# Patient Record
Sex: FEMALE | Race: WHITE
[De-identification: names, ages, dates, MRNs, and addresses within clinical notes are randomized per-mention and may not be internally consistent; named-entity substitution may affect disease eponyms.]

---

## 2019-03-04 ENCOUNTER — HOSPITAL ENCOUNTER (EMERGENCY)
Dept: HOSPITAL 46 - ED | Age: 54
Discharge: HOME | End: 2019-03-04
Payer: COMMERCIAL

## 2019-03-04 VITALS — HEIGHT: 67 IN | BODY MASS INDEX: 25.11 KG/M2 | WEIGHT: 160 LBS
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
## 2019-03-04 DIAGNOSIS — Z79.899: ICD-10-CM

## 2019-03-04 DIAGNOSIS — I10: ICD-10-CM

## 2019-03-04 DIAGNOSIS — F10.129: Primary | ICD-10-CM

## 2019-03-04 DIAGNOSIS — Z87.891: ICD-10-CM

## 2019-03-04 PROCEDURE — G0480 DRUG TEST DEF 1-7 CLASSES: HCPCS

## 2019-03-04 NOTE — EKG
[facility]
                                    [address]
                                  [place]  [address]
_________________________________________________________________________________________
                                                                 Signed   
 
 
Normal sinus rhythm
Possible Left atrial enlargement
Borderline ECG
When compared with ECG of 13-NOV-2017 15:45,
No significant change was found
Confirmed by CARLIN JANSEN MD ([id number]) on 3/4/2019 7:40:10 PM
 
 
 
 
 
 
 
 
 
 
 
 
 
 
 
 
 
 
 
 
 
 
 
 
 
 
 
 
 
 
 
 
 
 
 
 
 
 
 
    Electronically Signed By: CARLIN JANSEN MD  03/04/19 1940
_________________________________________________________________________________________
PATIENT NAME:     MARTIR RIVASHANNAH LITTLE                     
MEDICAL RECORD #: [id number]                     Electrocardiogram             
          ACCT #: [id number]  
DATE OF BIRTH:   [birth year]                                       
PHYSICIAN:   CARLIN JANSEN MD                   REPORT #: [id number]
REPORT IS CONFIDENTIAL AND NOT TO BE RELEASED WITHOUT AUTHORIZATION

## 2020-04-25 NOTE — EKG
[facility]
                                    [address] [place] [address]
                                  [place]  [address]
_________________________________________________________________________________________
                                                                 Signed   
 
 
Normal sinus rhythm
Possible Left atrial enlargement
Borderline ECG
When compared with ECG of 04-MAR-2019 15:26,
No significant change was found
Confirmed by YEE CASPER MD ([id number]) on 4/25/2020 10:14:18 PM
 
 
 
 
 
 
 
 
 
 
 
 
 
 
 
 
 
 
 
 
 
 
 
 
 
 
 
 
 
 
 
 
 
 
 
 
 
 
 
    Electronically Signed By: YEE CASPER MD  04/25/20 2214
_________________________________________________________________________________________
PATIENT NAME:     ANAHI RIVAS                     
MEDICAL RECORD #: [id number]                     Electrocardiogram             
          ACCT #: [id number]  
DATE OF BIRTH:   [birth year]                                       
PHYSICIAN:   YEE CASPER MD           REPORT #: [id number]
REPORT IS CONFIDENTIAL AND NOT TO BE RELEASED WITHOUT AUTHORIZATION

## 2022-01-21 NOTE — NUR
AFTER CHECKING IN PT, REQUESTED ANESTHESIA CONSULT WITH PATIENT REGARDING
ANXIETY, VITAL SIGNS, AND TREMOR. CRNA MEDICATED PT WITH PRECEDEX IN BAG OF
IVF. PULSE OXIMETER REMAINS ON PT. ONE ON ONE CARE AT THIS POINT.

## 2022-01-21 NOTE — NUR
01/21/22 0930 Germania Kraus
0922-PATIENT ARRIVED TO PACU ON 4L NC RR EVEN LAYING LEFT LATERAL.
PATIENT NONAROUSABLE, SR. ABDOMEN SOFT. IVF INFUSING.
 
0925-PATIENT AROUSING OPENING EYES ORIENTED TO PACU HOLDING PILLOW TO
CHEST. DENIES PAIN OR NAUSEA. PATIENT LOOKING AROUND.

## 2022-01-21 NOTE — NUR
PT SITTING UP IN BED, LEGS CROSSED AND READING. PT ADMITS SHE IS ANXIOUS, AND
DIDN'T REALLY KNOW WHY. GAVE COMFORT, PT REQUESTED PRAYER BEGAN SHIVERING. PT
ACCEPTED WARM BLANKET, IAIN NEWMAN IN TO CHECK ON PT.  CRNA DEMI ALSO IN CARING
FOR PT.  WILL  PT FOLLOWING DC. WILL FOLLOW AS NEEDED

## 2022-01-22 NOTE — OR
[facility]
                                    [address] [place] [address], [place]  [address]
_________________________________________________________________________________________
                                                                 Signed   
 
 
DATE OF OPERATION:
01/21/2022
 
SURGEON:
Rubén Buck MD
 
PREOPERATIVE DIAGNOSES:
1. Chronic abdominal pain.
2. Daily alcohol use.
3. Elevated liver function test.
4. Nausea and vomiting.
5. Odynophagia.
6. Gastroesophageal reflux disease.
7. History of non-steroidal anti-inflammatory drugs, now discontinued.
8. Intermittent rectal bleeding.
9. Intermittent diarrhea.
10. Intermittent melena.
 
POSTOPERATIVE DIAGNOSES:
1. Moderate diffuse gastritis.
2. Small hiatal hernia.
3. Mild distal esophagitis.
4. 12-15 mm polyp at 8 cm (left lateral, tattoo).
5. 6 mm polyps x2 at base of cecum.
6. 4 mm polyp on the ileocecal valve.
7. 4 mm polyps x3 in proximal right colon.
8. Minimal sigmoid diverticulosis.
 
PROCEDURES:
1. Esophagogastroduodenoscopy with CLOtest and biopsies of the duodenum, pyloric bulb
and antrum. 
2. Colonoscopy, snare polypectomy, hot biopsy and injection of tattoo.
3. Rigid proctoscopy.
 
INDICATIONS:
Brooke is a 56-year-old female, who was asked to see me for upper and lower endoscopy.
I had met her in 2010 at the age 44.  She had similar symptoms with chronic abdominal
pain and daily alcohol use.  Her liver function tests were elevated along with her mean
cell volume.  Of course, she has been encouraged not to use Tylenol.  She has had nausea
and vomiting, diaphoresis and even odynophagia.  She complains of acid reflux.  She has
been trying Protonix and sucralfate.  She has been given Phenergan as well.  She has
intermittent diarrhea and rectal bleeding associated with intermittent melena.  She
 
    Electronically Signed By: RUBÉN BUCK MD  01/22/22 0634
_________________________________________________________________________________________
PATIENT NAME:     BROOKE RIVAS                     
MEDICAL RECORD #: [id number]            OPERATIVE REPORT              
          ACCT #: [id number]  
DATE OF BIRTH:   [birth year]            REPORT #: [id number]      
PHYSICIAN:        RUBÉN BUCK MD             
PCP:              JACK CORONA PA-C           
REPORT IS CONFIDENTIAL AND NOT TO BE RELEASED WITHOUT AUTHORIZATION
 
 
                                  [facility]
                                    [address] [place], [place]  [address]
_________________________________________________________________________________________
                                                                 Signed   
 
 
discontinued her diclofenac.  She has had her gallbladder removed previously.  She could
not remember the upper and lower endoscopy from 2010.  She has no family history of
colon cancer or polyps.  Her  has also been through several colonoscopies with
myself.  Consequently, they are familiar with this process.  In the office, I gave her a
pamphlet on upper and lower endoscopy.  She understands the nature of the two tests.  We
reviewed the risks including, but not limited to gas bloating, crampy abdominal pain,
bleeding, perforation requiring surgery, and missed diagnosis.  Also, due to her medical
issues and her daily use of alcohol, we asked that an anesthesia provider help us once
again with monitored anesthesia care and infusion of propofol.  That proved to be a henderson
decision.  In fact, she required Precedex in our preop area due to withdrawal symptoms.
She had expressed understanding and wished to proceed. 
 
PROCEDURE IN DETAIL:
Brooke was taken into our endoscopy suite and placed in a supine semi-recumbent
position.  The posterior oropharynx was anesthetized with lidocaine spray.  A bite block
was utilized for the case.  The adult gastroscope was introduced and advanced under
direct visualization of camera.  Monitored anesthesia care was provided with propofol
and ketamine.  The duodenum was unremarkable.  We took a biopsy from it for pathologic
review.  We also took a biopsy of the pyloric channel for pathologic review.  The
stomach showed mild to moderate diffuse erythematous changes consistent with a daily
alcohol use.  We took a biopsy of the antrum for CLOtest as well as pathologic review.
We did not see any obvious ulcerations.  Upon retroflexion of scope, we could see just a
small hiatal hernia.  We did not see any gastric or esophageal varices.  The scope had
been withdrawn up through the area of the GE junction, which was compliant without
stricture.  She does have mild irritation and disruption around her Z-line.  There was
no Villa's mucosa.  Very minimal irritation at this point in her distal esophagus.
Again, we did not see any obvious varices.  The middle and upper esophagus were
unremarkable.  After this, the gas was suctioned out, the gastroscope removed.  Brooke
tolerated the procedure quite well. 
 
Brooke was then rotated into the left lateral decubitus position.  She was maintained on
monitored anesthesia care per our nurse anesthetist.  A digital rectal exam was
performed and I could feel a polypoid lesion somewhere between 4 and 7 cm just on
digital rectal exam.  She does have moderate sized and indurated external hemorrhoids.
She has good sphincter tone.  The adult colonoscope was introduced.  We could easily see
this 12 to 15 mm polyp in her rectum.  We removed it with a combination of the snare and
hot biopsy forceps.  We injected a small tattoo right in the base of the center of the
polypectomy site.  The scope had been reintroduced and we passed the scope all the way
up into the cecum without difficulty.  Her prep was good.  We could see the appendiceal
orifice and the ileocecal valve.  The above-mentioned polyps were removed with the help
of the hot biopsy forceps and then we used the snare on one of the polyps in the base of
the cecum.  The scope was then withdrawn and we saw few diverticula in the sigmoid
 
    Electronically Signed By: RUBÉN BUCK MD  01/22/22 0634
_________________________________________________________________________________________
PATIENT NAME:     BROOKE RIVAS                     
MEDICAL RECORD #: [id number]            OPERATIVE REPORT              
          ACCT #: [id number]  
DATE OF BIRTH:   [birth year]            REPORT #: [id number]      
PHYSICIAN:        RUBÉN BUCK MD             
PCP:              JACK CORONA PA-C           
REPORT IS CONFIDENTIAL AND NOT TO BE RELEASED WITHOUT AUTHORIZATION
 
 
                                  [facility]
                                    [address] [place] [address]  [address]
_________________________________________________________________________________________
                                                                 Signed   
 
 
colon.  There were small to moderate in size, few in number, and scattered about.  Once
in the rectum we retroflexed the scope and we did not see any additional pathology above
the anal canal.  We could see our polypectomy site.  We then inserted our rigid
proctoscope and we found the polypectomy site to be 8 cm in the left lateral decubitus
position.  After this, the gas was allowed to escape and the rigid proctoscope was
removed.  Brooke tolerated the procedure quite well. 
 
RECOMMENDATIONS:
I will see Boroke back in my office in 7 to 14 days to review her results.  She needs to 
strongly consider discontinuation of her alcohol.  She will need monitored anesthesia
care in the future as described above. 
 
 
 
            ________________________________________
            Rubén Buck MD 
 
 
ALB/MODL
Job #:  [id number]
DD:  01/21/2022 09:38:54
DT:  01/21/2022 10:08:28
 
cc:            ALISA Schulte MD
 
 
Copies:  JACK CORONA PA-C, ANDREW L MD
~
 
 
 
 
 
 
 
 
 
 
 
 
    Electronically Signed By: RUBÉN BUCK MD  01/22/22 0634
_________________________________________________________________________________________
PATIENT NAME:     MARTIR RIVASHANNAH LITTLE                     
MEDICAL RECORD #: [id number]            OPERATIVE REPORT              
          ACCT #: [id number]  
DATE OF BIRTH:   [birth year]            REPORT #: [id number]      
PHYSICIAN:        RUBÉN BUCK MD             
PCP:              JACK CORONA PA-C           
REPORT IS CONFIDENTIAL AND NOT TO BE RELEASED WITHOUT AUTHORIZATION

## 2022-01-24 NOTE — PATH
[facility]
                                    [address], [place]  [address]
_________________________________________________________________________________________
                                                                 Signed   
 
 
 
SPECIMEN(S): A DUODENAL BIOPSY
SPECIMEN(S): B DUODENAL BULB BIOPSY
SPECIMEN(S): C ANTRUM/PYLORUS BIOPSY
SPECIMEN(S): D RECTAL POLYP AT 8 CM
SPECIMEN(S): E CECAL POLYP
SPECIMEN(S): F ILEOCECAL VALVE POLYP
SPECIMEN(S): G ASCENDING/RIGHT COLON POLYP
 
SPECIMEN SOURCE:
A. DUODENAL BIOPSY
B. DUODENAL BULB BIOPSY
C. ANTRUM/PYLORUS BIOPSY
D. RECTAL POLYP AT 8 CM
E. CECAL POLYP
F. ILEOCECAL VALVE POLYP
G. ASCENDING/RIGHT COLON POLYP
 
CLINICAL HISTORY:
Esophagogastroduodenoscopy/colonoscopy.  Acid reflux, diarrhea, rectal 
bleeding.  Postop Dx: EGD: Gastritis, small hiatal hernia, distal esophagitis.  
Colon: Colorectal polyps, diverticulosis. 
 
FINAL PATHOLOGIC DIAGNOSIS:
A.  Duodenum, biopsy:
-  Duodenal mucosa with focal prominent Brunner's glands.
-  Negative for increased intraepithelial lymphocytes or villous blunting.
-  Negative for dysplasia or malignancy.
B.  Duodenal bulb, biopsy:
-  Duodenal mucosa with Brunner's glands hyperplasia.
-  Negative for increased intraepithelial lymphocytes or villous blunting.
-  Negative for dysplasia or malignancy.
C.  Stomach, antrum/pylorus, biopsy:
-  Reactive gastropathy.
-  Negative for Helicobacter organisms on HE stain.
-  Negative for dysplasia or malignancy.
D.  Rectum, polyp at 8 cm, polypectomy:
-  Fragments of tubular adenoma.
-  Negative for high-grade dysplasia or malignancy.
E.  Colon, cecum, polyp, polypectomy:
-  Fragments of tubular adenoma.
-  Negative for high-grade dysplasia or malignancy.
 
                                                                                    
_________________________________________________________________________________________
PATIENT NAME:     ANAHI RIVAS                     
MEDICAL RECORD #: [id number]            PATHOLOGY                     
          ACCT #: [id number]       ACCESSION #: [id number]     
DATE OF BIRTH:   [birth year]            REPORT #: [id number]       
PHYSICIAN:        RASHEED ALCOCER              
PCP:              JACK CORONA PA-C           
REPORT IS CONFIDENTIAL AND NOT TO BE RELEASED WITHOUT AUTHORIZATION
 
 
                                  [facility]
                                    [address], [place]  [address]
_________________________________________________________________________________________
                                                                 Signed   
 
 
F.  Colon, ileocecal valve, polyp, polypectomy:
-  Tubular adenoma.
-  Negative for high-grade dysplasia or malignancy.
G.  Colon, ascending/right, polyp, polypectomy:
 
-  Fragments of tubular adenoma.
-  Negative for high-grade dysplasia or malignancy.
NAL:cml:C2NR
 
MICROSCOPIC EXAMINATION:
Histologic sections of all submitted blocks are examined by light microscopy.  
These findings, together with the gross examination, support the pathologic 
diagnosis. 
 
GROSS DESCRIPTION:
Seven specimens are received in seven containers, labeled "RL."
A.  The specimen, labeled "RL, duodenum biopsy," is received in formalin and 
consists of two tan soft tissue fragments that measure 0.2 cm in greatest 
dimension.  The specimen is entirely submitted in 
cassette (A1).
B.  The specimen, labeled "RL, duodenum bulb biopsy," is received in formalin 
and consists of one tan soft tissue fragment that measures 0.2 cm in greatest 
dimension.  The specimen is entirely 
submitted in cassette (B1).
C.  The specimen, labeled "RL, antrum biopsy," is received in formalin and 
consists of one tan soft tissue fragment that measures 0.1 cm in greatest 
dimension.  The specimen is entirely submitted in 
cassette (C1).
D.  The specimen, labeled "RL, rectal polyp at 8 cm," is received in formalin 
and consists of multiple tan soft tissue fragments that measure 2.4 x 2.2 x 0.2 
cm in aggregate.  The specimen is entirely 
submitted in cassette (D1).
 
E.  The specimen, labeled "RL, cecum polyp," is received in formalin and 
consists of seven tan soft tissue fragments that measure 0.1-0.2 cm in greatest 
dimension.  The specimen is entirely submitted 
in cassette (E1).
F.  The specimen, labeled "RL: Ileocecal valve polyp," is received in formalin 
and consists of one tan soft tissue fragment that measures 0.1 cm in greatest 
dimension.  The specimen is entirely 
submitted in cassette (F1).
G.  The specimen, labeled "RL, ascending colon polyp," is received in formalin 
 
                                                                                    
_________________________________________________________________________________________
PATIENT NAME:     ANAHI RIVAS                     
MEDICAL RECORD #: [id number]            PATHOLOGY                     
          ACCT #: [id number]       ACCESSION #: [id number]     
DATE OF BIRTH:   [birth year]            REPORT #: [id number]       
PHYSICIAN:        RASHEED ALCOCER              
PCP:              JACK CORONA PA-C           
REPORT IS CONFIDENTIAL AND NOT TO BE RELEASED WITHOUT AUTHORIZATION
 
 
                                  [address]  [address]
_________________________________________________________________________________________
                                                                 Signed   
 
 
and consists of four tan soft tissue fragments that measure 0.1-0.2 cm in 
greatest dimension.  The specimen is entirely 
submitted in cassette (G1).
JS (under the direct supervision of a pathologist)
The Gross Description was prepared using a voice recognition system. The report 
was reviewed for accuracy; however, sound-alike word errors, addition and/or 
deletions may occur. If there is any 
question about this report, please contact Client Services.
 
PERFORMING LABORATORY:
The technical component was performed by [facility], [address] [place] [id number] (Medical Director: Naomi Acosta MD; CLIA# [id number]).  
Professional interpretation was performed by 
[facility], [facility], [address] [address] (CLIA# [id number]). 
 
Diagnostician:  Ashley Gray MD
Pathologist
Electronically Signed 01/24/2022
 
 
Copies:                                
~
 
 
 
 
 
 
 
 
 
 
 
 
 
 
 
 
 
 
 
 
                                                                                    
_________________________________________________________________________________________
PATIENT NAME:     ANAHI RIVAS                     
MEDICAL RECORD #: [id number]            PATHOLOGY                     
          ACCT #: [id number]       ACCESSION #: [id number]     
DATE OF BIRTH:   [birth year]            REPORT #: [id number]       
PHYSICIAN:        RASHEED ALCOCER              
PCP:              JACK CORONA PA-C           
REPORT IS CONFIDENTIAL AND NOT TO BE RELEASED WITHOUT AUTHORIZATION

## 2024-08-26 NOTE — NUR
PT ARRIVED TO THE UNIT VIA STRETCHER WITH RN. REPORT RECEIVED FROM ER RN. PT
SLEEPING ON ARRIVAL BUT EASILY WAKENS WITH VERBAL STIMULI. PT VITAL SIGNS ARE
STABLE.

## 2024-08-26 NOTE — NUR
RETURN TO CCU, REPORT RECEIVED FROM CRNA AND SURGERY RN. PATIENT RECEIVED
PROPOFOL AND KETAMINE FOR SEDATION DURNING ENDOSCOPY. PATIENT IS VERY DROWSY.
ABLE TO FOLLOW FEW COMMANDS. SOFI VILLAS RESTARTED.

## 2024-08-26 NOTE — NUR
REPORT RECEIVED. PATIENT IS AWARE . DR. BUCK HAS BEEN IN ROOM TO SEE PATIENT.
EGD PLANNED FOR TODAY. PATIENT IS DROWSY.

## 2024-08-26 NOTE — CONS
[facility]
                                    [address], [place]  [address]
_________________________________________________________________________________________
                                                                 Signed   
 
 
DATE OF CONSULTATION:
08/26/2024
 
CHIEF COMPLAINT:
Melena.
 
HISTORY OF PRESENT ILLNESS:
Brooke is a 58-year-old female, apparently I helped the endoscopy clear back in 2010 at
age of 44.  I also helped her with upper and lower endoscopy again in January 2022 at
the age of 56.  There is a long history of daily alcohol use with chronic abdominal
pain, elevated liver function test, nausea, vomiting, odynophagia, acid reflux, hiatal
hernia, intermittent rectal bleeding with intermittent diarrhea and intermittent melena.
 We found that indeed she had moderate diffuse gastritis and distal esophagitis along
with a small hiatal hernia.  She also had multiple polyps in the rectum and throughout
the colon.  She has minimal sigmoid diverticulosis.  Once again she had trouble now for
two days of nausea, vomiting, and dehydration.  She finally came to the emergency room
for evaluation.  She was severely dehydrated with a lactic acid of 12.9 and elevated
liver function tests with a low albumin and elevated INR and a slightly elevated ammonia
level.  She was admitted to the Internal Medicine Service.  She has been hydrated
overnight and overall doing better, but she is sedated this morning.  Of course, she
demonstrated melena and is guaiac positive.  I was therefore asked to see her as a
general surgeon on-call for consideration of upper endoscopy. 
 
PAST MEDICAL HISTORY:
Hiatal hernia, acid reflux, colonic polyps, diverticulosis, and cirrhosis of the liver.
 
PAST SURGICAL HISTORY:
Includes upper and lower endoscopy in 2010 at age 44 with Dr. Curtis and again upper and
lower endoscopy in January 2022 with Dr. Curtis at the age of 56.  She has also had a
laparoscopic cholecystectomy in 2017 with Dr. Colindres. 
 
SOCIAL HISTORY:
She drinks at least six beers every day.  She is  to Eduardo Coughlin at
[phone].  Kelly Terrazas is her Physician Assistant.  She prefers [facility]. 
 
FAMILY HISTORY:
Not obtainable.
 
REVIEW OF SYSTEMS:
She is sedated, not obtainable.  The records were reviewed.
 
ALLERGIES:
None.
 
 
    Electronically Signed By: RUBÉN BUCK MD  08/26/24 1137
_________________________________________________________________________________________
PATIENT NAME:     BROOKE RIVAS                     
MEDICAL RECORD #: [id number]            CONSULTATION                  
          ACCT #: [id number]  
DATE OF BIRTH:   [birth year]            REPORT #: [id number]      
PHYSICIAN:        RUBÉN BUCK MD             
PCP:              KELLY TERRAZAS PA-C           
REPORT IS CONFIDENTIAL AND NOT TO BE RELEASED WITHOUT AUTHORIZATION
 
 
                                  [facility]
                                    [address], [place]  [address]
_________________________________________________________________________________________
                                                                 Signed   
 
 
MEDICATIONS:
Spironolactone 25 mg p.o. daily, metoprolol ER 50 mg p.o. daily, Keppra 500 mg p.o.
b.i.d., bupropion 150 mg p.o. daily and vitamin D. 
 
PHYSICAL EXAMINATION:
VITAL SIGNS:  Blood pressure is 90/61, heart rate 113, respiratory rate 27, she is 98.5
degrees.  She is 98% on 2 L nasal cannula.  She is 5 feet 7 inches at 66 kg.  Body mass
index of 23. 
GENERAL:  Brooke is a 58-year-old female lying supine in her ICU bed.  She is sedated,
but she was arousable to alert and awake, but really not interactive and she was not
answering any questions.  She quickly fell back asleep. 
LUNGS:  Clear to auscultation bilaterally. 
HEART:  She is in sinus tachycardia with no murmurs. 
ABDOMEN:  Soft, flat, nontender.  No fluid wave.
 
LABORATORY DATA:
Her white blood count 9.9, hemoglobin was 12.2 it is down to 8.9 with resuscitation.
Her mean cell volume is 113.  Potassium 3.2, CO2 30, creatinine 0.65.  Platelet count
was 52,000, it was 77,000.  Lactic acid now 2.2, it was 12.9.  Phosphorus 2.1, magnesium
1.6.  Alcohol less than 3.  Total bilirubin 3.3, AST 82, ALT 35, alkaline phosphatase
93.  Beta-hCG negative.  Albumin is 1.9.  Urine specific gravity was greater than 1.030.
 Her INR is 2.42.  Ammonia level is 34. 
 
RADIOGRAPHIC STUDIES:
A chest x-ray from yesterday was unremarkable.  An ultrasound in 2017 with Dr. Colindres
showed possible sludge, but no mention of any cirrhosis.  An ultrasound in 2021 with
Kelly Terrazas showed what looks like some early cirrhosis and a common bile duct around 6
mm and then the ultrasound in December 2023 with Kelly Terrazas shows the common bile duct
now around 10 mm with the cirrhosis, but no ascites with normal hepatopetal flow. 
 
ASSESSMENT AND PLAN:
Brooke is a 58-year-old female, who presents with progressing cirrhosis related to her
alcohol consumption.  She once again has the same presentation with the abdominal pain,
nausea, vomiting, dehydration with associated daily alcohol use and probably withdrawal.
 I have been asked to see her for consideration of upper endoscopy.  We will have to
arrange this around the middle of a day with the help of an anesthesia provider.  In the
meantime, she needs her potassium and phosphorus magnesium replaced.  They do have a
banana bag with her IV.  We will need to get consent later today from her  with 
respect to her sedation.  I reviewed this with the nurse.  She has expressed
understanding and agrees with the above plan. 
 
 
 
            ________________________________________
 
    Electronically Signed By: RUBÉN BUCK MD  08/26/24 1137
_________________________________________________________________________________________
PATIENT NAME:     BROOKE RIVAS                     
MEDICAL RECORD #: [id number]            CONSULTATION                  
          ACCT #: [id number]  
DATE OF BIRTH:   [birth year]            REPORT #: [id number]      
PHYSICIAN:        RUBÉN BUCK MD             
PCP:              KELLY TERRAZAS PA-C           
REPORT IS CONFIDENTIAL AND NOT TO BE RELEASED WITHOUT AUTHORIZATION
 
 
                                  [address]
                                  [place], [place]  [address]
_________________________________________________________________________________________
                                                                 Signed   
 
 
            Rubén Buck MD 
 
 
ALB/MODL
Job #:  [id number]
DD:  08/26/2024 07:07:24
DT:  08/26/2024 07:41:10
 
cc:            MD Kelly Burgess PA-C
 
 
Copies:  RUBÉN BUCK MD, CHLOE K PA-C
~
 
 
 
 
 
 
 
 
 
 
 
 
 
 
 
 
 
 
 
 
 
 
 
 
 
 
 
 
 
    Electronically Signed By: RUBÉN BUCK MD  08/26/24 1137
_________________________________________________________________________________________
PATIENT NAME:     BROOKE RIVAS                     
MEDICAL RECORD #: [id number]            CONSULTATION                  
          ACCT #: [id number]  
DATE OF BIRTH:   [birth year]            REPORT #: [id number]      
PHYSICIAN:        RUBÉN BUCK MD             
PCP:              KELLY TERRAZAS PA-C           
REPORT IS CONFIDENTIAL AND NOT TO BE RELEASED WITHOUT AUTHORIZATION

## 2024-08-26 NOTE — NUR
UP TO COMMODE TO EXPELL URINE WITH LIQUID GREEN STOOL. UNSTEADY ON FEET.
FOLLOWS DIRECTION WELL. MORE ALERT NOW. SITTING UP IN BED TO ATTEMPT TO TAKE
CLEAR LIQUIDS WITH STRAWBERRY ENSURE.  IS IN ROOM. NO S/S OF DT'S.

## 2024-08-26 NOTE — NUR
Scheduled medications administered and assessment complete. Patient resting in
bed with eyes closed, awakens to this RN entering. Denies pain or needs. Takes
carafate in slurry. VSS, on RA, Afebrile. LSC, HRR, BTA. CMS, skin intact.
Denies need for BSC. Call light in reach.

## 2024-08-26 NOTE — NUR
Report received from Buffy TIMMONS. Patient resting in bed, states no needs at this
time, denies pain. IVF infusing WNL. Patient agreeable to plan of care for
night. Call light in reach.

## 2024-08-26 NOTE — NUR
PT IS RESTING IN BED WITH EYES CLOSED. RESPIRATIONS EVEN AND UNLABORED. PT
VITAL SIGNS ARE STABLE. CALL LIGHT WITHIN REACH. BED IN LOW AND LOCKED
POSTION WITH BED ALARM ON.

## 2024-08-26 NOTE — NUR
PATIENT PUT CALL LIGHT ON TO REQUEST TO GET OOB TO COMMODE. HAD VERY SOFT DARK
BROWN GREENISH STOOL. BACK TO BED WITH INCIDENT.

## 2024-08-26 NOTE — NUR
PT UP TO COMMODE. PT STATES SHE FEELS WEAK. PT AMBULATED TO BEDSIDE COMMODE
WITH X2 SBA. PT HAD SMALL BM, BLACK IN COLOR. PT ALSO INCONTIENT OF URINE.
NEW LINEN PROVIDED. PT WAS A X2 PERSON HEAVY ASSIST BACK TO BED. PT PROVIDED
WITH NEW GOWN. PT DENIES ANY PAIN OR FEELING NAUSEOUS AT THIS TIME.
PT IS ALERT, BUT FALLS ASLEEP WHILE TALKING. PT IS EASILY WOKEN UP BY VERBAL
STIMULATION. PT STATES SHE HAS NO FURTHER NEEDS AT THIS TIME. CALL LIGHT
WITHIN REACH, BED IN LOW AND LOCKED POSTION, BED ALARM ON.

## 2024-08-26 NOTE — NUR
SPOKE TO PATIENT ABOUT THE DISCHARGE PLAN. PATIENT'S  IS AT BEDSIDE.
PATIENT PLANS TO GO HOME WITH HER .PATIENT CAN AFFORD HOUSING AND FOOD.
PATIENT DOES NOT USE DME. PATIENT CAN TAKE CARE OF ADLS. PATIENT S/P GOING TO
THE OR TO BE SCOPED. PATIENT DENIES THE NEED FOR CASE MANAGEMENT'S
HELP AT THIS TIME. ENCOURANGED FAMILY TO LET  KNOW IF THEY NEED
FUTURE HELP.

## 2024-08-26 NOTE — NUR
UP TO BR TO VOID AND HAVE SMALL GRAINY BLACK STOOL. LEGS WEAK, NEEDS DIRECTION
AND ASSIST WITH TRANSFER TO COMMODE. CWIA DONE. RATES 2. NO TREATMENT NEEDED.
CLORAHEXADINE BATH GIVEN.

## 2024-08-26 NOTE — NUR
ASSESSMENT DONE. PATIENT REMAINS DROWSY. SLOW TO RESPOND TO QUESTIONS.
ABLE TO FOLLOW COMMANDS, SURGICAL PERMIT SIGNED. PATIENT C/O SLIGHT HA. HAS
SLIGHT DISCOMFORT IN LOWER ABD.

## 2024-08-26 NOTE — NUR
UR CLINICAL REVIEW:
[facility] INPT CRITERIA FOR
GI BLEED,WILL NOTIFY MD REYES
INPT 08/26/24 @ 8816
WILL UPDATE REG
WILL FAX CLINICALS TO
[facility] FOR AUTH
DISCHARGE TO HOME WHEN
STABLE

## 2024-08-26 NOTE — NUR
PT UP TO COMMODE AND BACK TO BED. PT AMBULATED WELL WITH X1 SBA. PT VOIDED
500CC. PT PROVIDED WITH A WARM BLANKET. NO FURTHER NEEDS AT THIS TIME. CALL
LIGHT WITHIN REACH. BED IN LOW AND LOCKED POSTION WITH BED ALARM ON.

## 2024-08-26 NOTE — NUR
THIS SRT IN ROOM WITH IAIN MORRIS TO ASSIST WITH MORNING MED PASS. PT SNORING IN
BED, VERY SLEEPY. PT ROUSABLE WITH SOME EFFORT. MORNING VS DONE, MEDS GIVEN.
DR. JUNIOR IN ROOM FOR MORNING ROUNDING. CALL LIGHT WITHIN REACH, PT DENIES
ANY FURTHER NEEDS AT THIS TIME.

## 2024-08-27 NOTE — NUR
SBAR REPORT RECEIVED FROM IAIN MORRIS.  ALL EVENTS OF THE DAY WERE DISCUSSED AND
PLAN OF CARE REVIEWED.  PATIENT ANAHI IS NOTED TO BE PLEASANT AND
COOPERATIVE, CONVERSING WITH RN STAFF, ABLE TO ENDORSE NEEDS AND DESIRES.
 
1959- RN LAB DRAW FOR CBC/BMP
2005- 1 P ASSIST TO BATHROOM.  300CC YELLOW URINE/STOOL MIX.  SURJIT CARE
PERFORMED WITH ASSISTANCE
2006- BACK TO BED, SCD'S PLACED BACK ON, WARM BLANKET PROVIDED.  ANAHI
REQUESTED "SOMETHING FOR SLEEP" TONIGHT.
2030- MD FRIED CALLED FOR MELATONIN.  SEE EMAR FOR NEW ORDER.
 
VSS AND WDL PER MONITOR
 
NEUR0- ALERT AND ORIENTED X 4, FOLLOWS COMMANDS, ENDORSES DULL PAIN IN
ABDOMEN. WARM PACKS PROVIDED, PERRL, STANDBY ASSIST
 
CARDIAC- ST, AFEBRILE, NO EDEMA, PALPABLE PULSES, MURMUR AUSCULTATED
 
RESP- RA, CLEAR BREATH SOUNDS CLEAR, 02 97%
 
GI/- HYPERACTIVE BOWEL TONES, VOIDS/ BM IN COMMODE, BILIOUS STOOL NOTED,
SWALLOWS PILLS DISSOLVED IN LIQUID
 
INT- SEE ASSESSMENT
 
BILATERAL PIV- PATENT AND INTACT.  ABLE TO FLUSH BUT NOT ASPIRATE
 
NS AT 75CC PER HOUR

## 2024-08-27 NOTE — NUR
Rounded on patient, resting in bed with eyes closed, RR even and unlabored. IV
pump totals cleared. No identified needs at this time.

## 2024-08-27 NOTE — NUR
Patient resting in bed with eyes closed. Even and unlabored RR noted. IVF
infusing WNL. On RA. VSS. Call light in reach.

## 2024-08-27 NOTE — NUR
SITTING UP IN CHAIR. ABD IS MORE DISTENDED. HAS BEEN USING HEAT FOR COMFORT ON
LOWER ABD. PATIENT HAS BEEN ALERT MOST OF THE DAY. COOPERATIVE. EYES JAUNDICE.
TALKED WITH PATIENT ABOUT GETTING HELP FOR ETOH ADDICTION. PATIENT RECEPTIVE
TO THIS TALK.

## 2024-08-27 NOTE — NUR
REPORT RECEIVED. PATIENT UP TO COMMODE TO EXPELL URINE WITH GREEN STOOL THEN
TO CHAIR. FOLLOWING DIRECTION. SHAKEY ON FEET.

## 2024-08-27 NOTE — NUR
Rounded on patient for focused assessment, patient resting in bed with eyes
closed, on RA, even and unlabored respirations noted. IVF infusing. VSS. No
needs identified at this time. Call light in reach.

## 2024-08-27 NOTE — NUR
Rounded on patient, who is sitting up in bed with gown off, appearing confused
to surroundings. Startled to this RN entering. Patient states "I wet the bed",
this RN and RN Alecia in room for linen change and assisting patient to BSC for
further void. Smear of green/brown BM noted. Patient denies needs at this
time. New bag of IV fluid hung per order. Call light in reach.

## 2024-08-27 NOTE — NUR
PT REQUESTED TO USE BSC. THIS SRT AND IAIN MORRIS IN ROOM TO ASSIST. PT SEEMS TO
BE MOVING BETTER AND STRONGER TODAY. PLACED BSC AT 90 DEGREE ANGLE TO BED. PT
VOIDED 300 CC OF DARK ORANGE COLORED URINE. IAIN MORRIS ASSISTED PT BACK TO BED
WHILE THIS SRT MEASURED AND FLUSHED URINE. TRASHES EMPTIED, ROOM TIDIED.
SCD'S HOOKED BACK UP, CALL LIGHT WITHIN REACH. PT DENIES ANY FURTHER NEEDS AT
THIS TIME.

## 2024-08-27 NOTE — NUR
HOURLY ROUNDING COMPLETED.  ANAHI REPORTED RESTOOM  NEEDS.  STANDBY ASSIST TO
RESTROOM.  SINUS TACHYCARDIA WITH ACTIVITY .
300CC CLEAR
YELLOW URINE.
 
BACK IN BED.  NO OTHER NEEDS REPORTED AT THIS TIME

## 2024-08-27 NOTE — NUR
VISITED DURING SPIRITUAL CARE ROUNDS.  PT APPEARED TO BE SLEEPING.  DID NOT
DISTURB.  PROVIDED PRAYER.

## 2024-08-27 NOTE — OR
[facility]
                                    [address], [place]  [address]
_________________________________________________________________________________________
                                                                 Signed   
 
 
DATE OF OPERATION:
08/26/2024
 
SURGEON:
Rubén Buck MD
 
PREOPERATIVE DIAGNOSES:
1. Melena.
2. Alcohol withdrawal.
3. Nausea and vomiting.
4. Cirrhosis of the liver.
5. Hiatal hernia.
6. Acid reflux.
 
POSTOPERATIVE DIAGNOSES:
1. Distal antral gastric ulcer (nonbleeding).
2. Moderate diffuse gastritis.
3. Mild-to-moderate distal esophagitis.
4. Tiny hiatal hernia.
 
PROCEDURE:
Esophagogastroduodenoscopy with CLOtest.
 
ESTIMATED BLOOD LOSS:
None.
 
INDICATIONS:
Brooke is a 58-year-old female, well known to our hospital.  She has alcohol-related
cirrhosis of the liver.  I helped her clear back in 2010 at the age of 44 with upper and
lower endoscopy.  I helped her again in January of 2022 at the age of 56.  She is known
to have some gastritis, esophagitis, and a small hiatal hernia.  She also had colonic
polyps and diverticulosis.  In 2017, Dr. Colindres had removed her gallbladder.  In looking
back, the ultrasound in 2017 mentioned some sludge in the gallbladder, but no obvious
cirrhosis.  An ultrasound in 2021 describes cirrhosis and the common bile duct is 6 mm.
Another ultrasound in December of 2023 shows again that the gallbladder had been
removed.  The common bile duct up to about 10 mm along with the cirrhosis of the liver,
but no ascites.  Brooke had developed nausea, vomiting, probably some level of alcohol
withdrawal in the last couple of days.  She finally came to the emergency room for
evaluation.  She was having black tarry stool.  Of course, it is guaiac positive.  She
was quite dehydrated with a lactic acid of 12.2.  Liver function tests were off
including INR 2.42.  She was admitted to the hospital service.  She has been
aggressively hydrated, lactic acid come down to 2.2.  Her electrolytes have been
 
    Electronically Signed By: RUBÉN BUCK MD  08/27/24 0602
_________________________________________________________________________________________
PATIENT NAME:     BROOKE RIVAS                     
MEDICAL RECORD #: [id number]            OPERATIVE REPORT              
          ACCT #: [id number]  
DATE OF BIRTH:   [birth year]            REPORT #: [id number]      
PHYSICIAN:        RUBÉN BUCK MD             
PCP:              JACK CORONA PA-C           
REPORT IS CONFIDENTIAL AND NOT TO BE RELEASED WITHOUT AUTHORIZATION
 
 
                                  [facility]
                                    [address], [place]  [address]
_________________________________________________________________________________________
                                                                 Signed   
 
 
replaced.  Her hemoglobin went from 12.2 down to 8.9 with resuscitation.  The platelets
were 77,000, down to 52,000.  The mean cell volume is 113.  Total bilirubin is 3.3, AST
82, ALT 35, alkaline phosphatase is 93, and albumin is 1.9.  Ammonia was a little up at
34.  I have been asked to see her as a general surgeon on-call for consideration of
repeat upper endoscopy.  Otherwise, Brooke has done well overnight.  She has also needed
a little Ativan throughout the night.  I met with Brooke this morning, and I reviewed
the above findings with her.  We also reviewed upper endoscopy.  She is very aware of
upper and lower endoscopy.  She understands there is risk including, but not limited to
gas bloating, crampy abdominal pain, bleeding, perforation requiring surgery, and missed
diagnosis.  We also reviewed the need for monitored anesthesia care given her medical
issues including liver failure as well as the acuity of the situation.  She had
expressed understanding and wished to proceed. 
 
PROCEDURE NOTE:
Brooke was taken into our endoscopy suite and placed in the supine semi-recumbent
position.  She was given monitored anesthesia care with propofol infusion per our nurse
anesthetist.  A bite block was utilized for the case.  The adult gastroscope was
introduced and advanced under direct visualization of camera without difficulty.  The
duodenum and pyloric bulb were unremarkable; however, she has a moderate-sized
nonbleeding ulcer in the distal antrum with surrounding edema and erythema.  We took a
single biopsy in the antrum for CLOtest.  The rest of her stomach showed moderate
diffuse gastritis, which was chronic in her situation.  She has a tiny hiatal hernia,
which we saw upon retroflexion of scope.  The scope was withdrawn up to the area of the
GE junction, which was compliant without stricture.  We saw no gastric or esophageal
varices.  From the vomiting, she does have some irritation around the Z-line and up the
distal esophagus.  The middle and upper esophagus were much better.  After this, the gas
was suctioned out and the gastroscope removed.  Brooke tolerated the procedure quite
well. 
 
RECOMMENDATIONS:
Brooke will be returning to the ICU under the hospital service.  She will be returning
to a clear liquid diet and stay on her proton pump inhibitor. 
 
 
 
            ________________________________________
            Rubén Buck MD 
 
 
ALB/MODL
Job #:  [id number]
DD:  08/26/2024 12:06:52
 
    Electronically Signed By: RUBÉN BUCK MD  08/27/24 0602
_________________________________________________________________________________________
PATIENT NAME:     BROOKE RIVAS                     
MEDICAL RECORD #: [id number]            OPERATIVE REPORT              
          ACCT #: [id number]  
DATE OF BIRTH:   [birth year]            REPORT #: [id number]      
PHYSICIAN:        RUBÉN BUCK MD             
PCP:              JACK CORONA PA-C           
REPORT IS CONFIDENTIAL AND NOT TO BE RELEASED WITHOUT AUTHORIZATION
 
 
                                  [facility]
                                    [address] [place]  [address]
_________________________________________________________________________________________
                                                                 Signed   
 
 
DT:  08/26/2024 12:53:13
 
cc:            ALISA Schulte MD
 
 
Copies:  JACK CORNOA PA-C, ANDREW L MD
~
 
 
 
 
 
 
 
 
 
 
 
 
 
 
 
 
 
 
 
 
 
 
 
 
 
 
 
 
 
 
 
 
 
    Electronically Signed By: RUBÉN BUCK MD  08/27/24 0602
_________________________________________________________________________________________
PATIENT NAME:     BROOKE RIVAS                     
MEDICAL RECORD #: [id number]            OPERATIVE REPORT              
          ACCT #: [id number]  
DATE OF BIRTH:   [birth year]            REPORT #: [id number]      
PHYSICIAN:        RUBÉN BUCK MD             
PCP:              JACK CORONA PA-C           
REPORT IS CONFIDENTIAL AND NOT TO BE RELEASED WITHOUT AUTHORIZATION

## 2024-08-27 NOTE — NUR
IN ROOM WITH IAIN MORRIS FOR MORNING CARES AND MORNING MED PASS. PT IS SITTING UP
IN CHAIR WITH BLANKET, REQUESTS THAT HER FEET STAY DOWN FOR CURRENT MOMENT. PT
REFUSED BREAKFAST, BUT WAS AGREEABLE TO AN APPLE FLAVORED ENSURE ON BEDSIDE
TABLE IN CASE SHE FELT LIKE HAVING SOME LATER. PT HAS BEEN DRY HEAVING. IAIN MORRIS ADMINISTERED ANTI-NASUEA MEDS. MORNING MED PASS DONE.  RAJ IN
ROOM. ROOM TIDIED, TRASH EMPTIED. CALL LIGHT WITHIN REACH, PT AND  DENY
AND FURTHER NEEDS AT THIS TIME.

## 2024-08-27 NOTE — NUR
Sitting up in recliner. Discussed alcohol use. Offered to assist patient to
get connected to [facility]. States "I'm sure I can find the number in the phone
book or on the internet." Does not want further information at this time. INDIA Cheatham RN, notified of converstation with patient.

## 2024-08-27 NOTE — NUR
ASSESSMENT DONE. NAUSEATED. NOT ABLE TO TAKE BREAKFAST AT THIS TIME. C/O LOWER
ABD PAIN. ABD IS SOFT AND DISTENDED. IVF PATENT. SOMEWHAT SLOW TO ANWERE
QUESTIONS. FLAT AFFECT. TALKED WITH PATIENT ABOUT POC. HAS NO QUESTIONS AT
THIS TIME.

## 2024-08-27 NOTE — NUR
TOOK ONLY FEW BITES OF DINNER. HAS BEEN TAKING MILK AND APPLE ENSURE. IV KCL
INFUSING. DR. FRIED AWARE THE LABS WILL BE DRAWN LATE. ABD IS LARGER TODAY
THAN YESTERDAY. ABD IS SOFT.

## 2024-08-27 NOTE — NUR
PM MEDICATIONS TAKEN WHOLE WITH STRAWBERRY ENSURE.  ANAHI ENDORSED RESTROOM
NEEDS. STAND BY ASSIST TO BATHROOM.  300CC YELLOW URINE VOIDED.  BACK IN BED,
STATES COMFORTABLE

## 2024-08-28 NOTE — NUR
REPORT RECEIVED FROM SHEYLA @ 9265.  PT UP IN RECLINER, NO NEEDS AT THIS
TIME.  WHITE BOARD UPDATED.

## 2024-08-28 NOTE — NUR
HAND OFF REPORT RECEIVED FROM IAIN KIM. PT SITTING IN CHAIR. PT STATES SHE IS
TIRED AND DID NOT SLEEP VERY WELL. PT IS A/O. PT IS ON ROOM AIR WITH
SATURATION %. PT BT ARE ACTIVE AND ABDOMEN IS TENDER TO TOUCH. PT STATES
HER PAIN LEVEL IS A 3/10. PT DENIES FEELING NAUSEOUS. PT VITAL SIGNS STABLE.
NO NEEDS AT THIS TIME. CALL LIGHT WITHIN REACH.

## 2024-08-28 NOTE — NUR
PT TO ROOM ON MED/SURG AFTER WORKING WITH PHYSICAL THERAPY. RECEIVED REPORT
FROM IAIN PENA. PT UP TO CHAIR AWAKE, DENIES PAIN, NAUSEA, AND SOB AT THIS
TIME. PT ARRIVES TO ROOM WITH ONE IV IN L AC. SCDs NOT IN PLACE AT THIS TIME
D/T PT WORKING WITH PHYSICAL THERAPY AND THEN BEING UP TO CHAIR IN ROOM.
ABDOMEN REMAINS MILDLY DISTENDED, TENDER TO PALPATION. BOWEL TONES ACTIVE.
GENERALIZED SWELLING PRESENT IN L HAND, CCU RN STATES BEGAN WHEN IV IN L HAND
WAS IN, L HAND IV DC'D PRIOR TO PT TRANSFER PER CCU RN. PT DENIES PAIN IN
AREA, NO REDNESS IN AREA. PT STATES NO NEEDS AT THIS TIME, CALL LIGHT WITHIN
REACH.

## 2024-08-28 NOTE — NUR
OCCUPATIONAL THERAPY IN ROOM TO SEE PATIENT. PT UP TO BATHROOM, X1 SBA. PT
TOLERATED WELL. PT VOIDED 300CC OF YELLOW URINE. PT BRUSHED TEETH AND PROVIDED
WITH A WASH CLOTH TO WASH HER FACE. PT BACK TO CHAIR. NO FURTHER NEEDS AT THIS
TIME. CALL LIGHT WITHIN REACH.

## 2024-08-28 NOTE — NUR
PT SITTING UP IN CHAIR EATING LUNCH. DR FRIED ROUNDED IN PT ROOM TO DISCUSS
UPDATED PLAN OF CARE. PLAN IS TO MOVE PT TO THE MED-SURG FLOOR AND DISCHARGE
IN THE MORNING. PT APPEARS TO BE IN A MUCH BETTER MOOD THIS AFTERNOON. PT IS
ALERT AND ORIENTED. PT REMAINS ON ROOM AIR, LUNG SOUNDS ARE CLEAR. PT
BT ARE ACTIVE, ABDOMEN REMAINS TENDER TO TOUCH. PT DENIES ANY PAIN OR FEELING
NAUSEOUS AT THIS TIME. PT HAS NO NEEDS AT THIS TIME. CALL LIGHT WITHIN REACH.

## 2024-08-28 NOTE — NUR
ASSESSMENT COMPLETED.  PT IN BED,  WAS VISITING, BUT HAS GONE HOME.
R/A, ANSWERS QUESTIONS APPROPRIATELY, KNOWN TO THIS NURSE.  PT DID NOT STATE
SHE REMEMBERS THIS RN.  PT DENIES PAIN, OR NEEDS.  HS MEDICATIONS
ADMINISTERED, PT HAS STATED SHE DID NOT SLEEP MUCH LAST NIGHT IN CCU.  HOPES
TO SLEEP.  POC ACCEPTABLE TO PT.  PT INSTRUCTED TO CALL, ALL SUPPLIES WITHIN
REACH.

## 2024-08-28 NOTE — NUR
PT UP TO RESTROOM. PT WAS STEADY ON FEET,  X1 SBA. PT VOIDED 200CC OF URINE.
PT STATES SHE WANTED TO GET BACK IN BED AND TRY TO SLEEP. PT BECAME TEARFUL,
AND STATES SHE WANTED TO BE LEFT ALONE. PT REQUESTED DOOR TO BE CLOSED. CALL
LIGHT WITHIN REACH. BED IN LOW AND LOCKED POSITON.

## 2024-08-28 NOTE — NUR
RIGHT PERIPHERAL IV SITE TAKEN OUT. TIP INTACT. PT TOLERATED WELL. COBAND AND
GAUZE APPLIED TO SITE.

## 2024-08-28 NOTE — NUR
PATIENT ANAHI IS RESTING WITH EYES CLOSED.  LYING ON RIGHT SIDE.  VSS WDL PER
MONITOR.  SAFETY CHECK OF ROOM PERFORMED. NO NEEDS IDENTIFIED AT THIS TIME

## 2024-08-28 NOTE — NUR
pt resting in bed with eyes closed. Respirations even and unlabored. Pt vital
signs stable. call light within reach.

## 2024-08-28 NOTE — NUR
REFERRED BY IIAN KIM FOR VISIT.  PT EXHIBITING SIGNS OF DEPRESSION:  FLAT
AFFECT, LACK OF ENGAGEMENT, LIMITED RESPONSES.   PROVIDED SUPPORTIVE
PRESENCE, FACILITATED INTERACTION WITH THERAPY DOG, FACILITATED LIFE REVIEW,
PROVIDED PRAYER.  PT EXPRESSED HOPE, GRATITUDE.

## 2024-08-28 NOTE — NUR
PT UP TO CHAIR EATING DINNER, DENIES PAIN, NAUSEA, OR SOB AT THIS TIME. PT
STATES NO CURRENT NEEDS. CALL LIGHT WITHIN REACH.

## 2024-08-28 NOTE — NUR
PATIENT ASSISTED TO THE BR AS A SBA. PATIENT INCONTINENT OF A SMALL AMOUNT OF
URINE. PATIENT ABLE TO VOID AND HAVE BM. PATIENT ABLE TO COMPLETE SELF CARE.
PATIENT DID HAVE NOTED BRIGHT RED BLOOD ON WIPE WHEN COMPLETING SELF SURJIT
CARE. NO NOTED BLOOD IN TOILET WITH BM OR URINE. PATIENTS LINEN AND GOWN
CHANGED. PATIENT IS BACK IN BED RESTING. PATIENT PROVIDED WITH GINGER ALE.
PATIENT DENIES ANY FURTHER NEEDS. CALL LIGHT IN REACH. IV INFUSING PER ORDER. Walk in [employer]

## 2024-08-28 NOTE — NUR
ANAHI CALLED AND ENDORSED RESTROOM NEEDS.  300CC YELLOW URINE.
 
BACK IN BED, TUCKED IN, SCD IN PLACE, GTT NS 75CC PER HOUR

## 2024-08-28 NOTE — NUR
PT LEFT HAND PERIPHERAL IV TAKEN OUT. PT STATES IV SITE IS PAINFUL. IV SITE
WITH TRACE AMOUNT OF SWELLING NOTED. TIP INTACT WITH REMOVAL. PATIENT
TOLERATED WELL.

## 2024-08-28 NOTE — NUR
PT LEFT UNIT AND TRANSFERRED TO MEDICAL FLOOR ROOM 113. ALL PATIENT BELONGINGS
TAKEN WITH PATIENT. PATIENT VITAL SIGNS STABLE. REPORT GIVEN TO IAIN WILLAMS.

## 2024-08-28 NOTE — NUR
UR CONCURRENT REVIW:
MCG- DOES NOT MEET GL STAGE
2 FOR HEMATIOLOGY GRG.
IOANA COMPLETED
[facility] BCBS
INPT 02/26/24 @ 2935
CLINICAL SUBMITTED FOR AUTH
REVIEW
DISCHARGE TO HOME WHEN
STABLE
08/30/24

## 2024-08-28 NOTE — NUR
PRISCILLA ASSIST TO THE RESTROOM.  UP IN CHAIR. PATIENT ENDORSES HUNGER THIS AM.
ANAHI WOULD LIKE A STRAWBERRY/
BANANA SMOOTHIE, BUTTERED WHEAT TOAST, APPLE ENSURE, AND WHOLE MILK FOR
BREAKFAST.

## 2024-08-28 NOTE — NUR
PT FINISHED WITH LUNCH AND WANTING TO GET BACK IN BED. PT AMBULATED TO BED
WITH X1 SBA AND TOLERATED WELL. PT REQUEST HER WATCH OUT OF LOCK BOX,
WATCH PROVIDED. PT ATE ABOUT 40% OF HER LUNCH, STATES SHE WANTED TO TAKE IT
SLOW. PT HAS NO FURTHER NEES AT THIS TIME. CALL LIGHT WITHIN REACH.

## 2024-08-29 NOTE — NUR
PT UP TO CHAIR AWAKE, DENIES PAIN, NAUSEA, OR SOB. TELE REMAINS IN PLACE. L
HAND EDEMA DECREASED TO TRACE AMOUNT. PT NOT WEARING SCDs D/T WALKING
AROUND. PT CONTINUES TO HAVE MINIMAL DISTENTION AND TENDERNESS TO ABDOMEN,
BOWEL TONES ACIVE. PT EXPRESSES ANXIETY SURROUNDING BILLS FOR HOSPITAL STAY
AND PRESCRIPTIONS. CASE MANAGEMENT CONTACTED AND UPDATED. PT STATES NO
FURTHER NEEDS AT THIS TIME. CALL LIGHT WITHIN REACH.

## 2024-08-29 NOTE — NUR
THIS RN ASSUMING CARE OF PATIENT. PATIENT RESTING IN BED, DENIES NEEDS AT THIS
TIME. CALL LIGHT IN REACH.

## 2024-08-29 NOTE — NUR
PATIENT IN CHAIR AT THIS TIME. VITALS AND I&O'S CHARTED. CALL LIGHT WITHIN
REACH, NO FURTHER NEEDS AT THIS TIME.

## 2024-08-29 NOTE — NUR
CALL LIGHT ANSWERED. PATIENT UP TO BATHROOM INDEPENDENTLY TO VOID. PATIENT
BACK TO BED. THIS ASKED PATIENT IF SHE HAS ANY PAIN, OR PAIN FROM REGULAR DIET
YESTERDAY. PATIENT DENIES PAIN. NO FURTHER NEEDS. CALL LIGHT IN REACH.

## 2024-08-29 NOTE — NUR
Requested by pts Rn to return to room and speak with pt. Pt is extremely
anxious. In and spoke with pt and she is near tears several times during the
conversation. Pt is concerned her insurance will not cover her stay.  She
believes her insurance may have been down graded. I let her know we have not
recieved a denial from her insurance. When she receives a bill she can fill
out a financial assistance form if she has difficulty pay her bill. Pt is very
anxious and close to tears. I asked pt what had caused her to be so upset and
she could not say. I asked if she has depression and she does, she also sees a
counselor weekly. I asked if I could schedule an appt for her to see and she
does not want to give out their name. She states she will schedule when she
gets home. She states Dr. Walsh agreed to order her an antidepressant. Pt
stating I just sit around the house, Im not working. Her  works long
shifts and will not be home until around 8.  I asked if I can call a friend to
stay with her until her spouse gets home and she declined. I asked pt if she
ever thinks about suicide. She states, "I'm to scared."  I asked again if she
is suicidal and she shrugged her shoulders and states, "I wouldn't do it". Pt
is upset as she planned on leaving by 9am this morning and plans were changed
due to emergency pt on the floor.  was not able to see pt first and then pt
need K and MG IV. Pt stating she is ok and will go home by taxi when IV's are
complete. I confirmed with Dr. Walsh he is ordering her an antidepressant.
Pt will discharge when IV's complete.

## 2024-08-29 NOTE — NUR
CALL LIGHT ANSWERED. PT NEEDED TO USE BATHROOM. CNA SBA PT TO BATHROOM. PT
VOIDED. PT ASSISTED BACK TO BED. CNA OBTAINED AND DOCUMENTED I&O. PT STATES NO
FURTHER NEEDS AT THIS TIME. CALL LIGHT WITHIN REACH.

## 2024-08-29 NOTE — NUR
PATIENT ASKING HOW TO PLACE ORDER TO KITCHEN FOR BREAKFAST. THIS RN PROVIDED
MENU AND EDUCATED PATIENT HOW TO ORDER FOOD. PATIENT VERBILIZED UNDERSTANDING.
VS AND I&Os OBTAINED AND RECORDED. SCHEDULED MEDICATION ADMNISTERED. PATIENT
DENIES NEEDS AT THIS TIME. FRESH WATER PROVIDED. CALL LIGHT IN REACH.

## 2024-09-08 NOTE — XMS
PreManage Notification: ANAHI RIVAS MRN:[id number]
 
Security Information
 
Security Events
No recent Security Events currently on file
 
 
 
CRITERIA MET
------------
- [facility] - 2 Visits in 30 Days
 
 
CARE PROVIDERS
There are no care providers on record at this time.
 
[facility] has no Care Guidelines for this patient.
 
[facility] VISIT COUNT (12 MO.)
-------------------------------------------------------------------------------------
[facility]
-------------------------------------------------------------------------------------
TOTAL 2
-------------------------------------------------------------------------------------
NOTE: Visits indicate total known visits.
 
ED/C VISIT TRACKING (12 MO.)
-------------------------------------------------------------------------------------
09/08/2024 09:56
[facility] OR
 
TYPE: Emergency
 
COMPLAINT:
- ALTERED STATUS
-------------------------------------------------------------------------------------
08/25/2024 17:37
[facility]
 
TYPE: Emergency
 
COMPLAINT:
- VOMITING
-------------------------------------------------------------------------------------
 
 
INPATIENT VISIT TRACKING (12 MO.)
-------------------------------------------------------------------------------------
08/26/2024 08:25
[facility]
 
TYPE: Medical Surgical
 
COMPLAINT:
- DEHYDRATION,HYPOKALEMIA,ETOH WITHDRAWEL
 
DIAGNOSES:
- Acidosis, unspecified
 
- Acidosis, unspecified
- Acquired absence of other specified parts of digestive tract
- Acquired absence of other specified parts of digestive tract
- Alcohol use, unspecified with withdrawal, unspecified
- Alcohol use, unspecified with withdrawal, unspecified
- Alcoholic cirrhosis of liver without ascites
- Alcoholic cirrhosis of liver without ascites
- Anemia, unspecified
- Anemia, unspecified
- Chronic or unspecified gastric ulcer with hemorrhage
- Chronic or unspecified gastric ulcer with hemorrhage
- Dehydration
- Dehydration
- Depression, unspecified
- Depression, unspecified
- Diaphragmatic hernia without obstruction or gangrene
- Diaphragmatic hernia without obstruction or gangrene
- Essential (primary) hypertension
- Essential (primary) hypertension
- Gastritis, unspecified, with bleeding
- Gastritis, unspecified, with bleeding
- Gastro-esophageal reflux disease with esophagitis, with bleeding
- Gastro-esophageal reflux disease with esophagitis, with bleeding
- Gastrointestinal hemorrhage, unspecified
- Hypokalemia
- Hypokalemia
- Hypomagnesemia
- Hypomagnesemia
- Other disorders of phosphorus metabolism
- Other disorders of phosphorus metabolism
- Other specified postprocedural states
- Other specified postprocedural states
- Personal history of colonic polyps
- Personal history of colonic polyps
- Personal history of nicotine dependence
- Personal history of nicotine dependence
- Thrombocytopenia, unspecified
- Thrombocytopenia, unspecified
- Tobacco abuse counseling
- Tobacco abuse counseling
-------------------------------------------------------------------------------------
 
[email or web address]

## 2024-09-08 NOTE — NUR
EVENING ASSESSMENT COMPLETE. EVENING MEDICATIONS ADMIN PER EMAR. PT ALERT AND
ORIENTED X 3. VS AND I&O OBTAINED. PT DENIES NEEDING TO VOID AT THIS TIME.
PT RESTLESS IN BED SETTING OFF THE BED ALARM MULTIPLE TIMES. TREMORS NOTED IN
BUE. PT STATES CHRONIC. OCCASIONAL BACK SPASMS WITH MOVEMENT NOTED. ASSISTED
PT TO REPOSITION. PT DENIES QUESTIONS OR CONCERNS. CALL LIGHT IN REACH. BED
ALARM FOR SAFETY.

## 2024-09-08 NOTE — NUR
REPORT RECEIVED FROM DAY SHIFT RN. PT LYING IN BED ALERT. ASSISTED TO
REPOSITION. DENIES NEEDS. WHITE BOARD UPDATED. BED ALARM FOR SAFETY. CALL
LIGHT IN REACH.

## 2024-09-08 NOTE — EKG
[facility]
                                    [address]
                                  [place]  [address]
_________________________________________________________________________________________
                                                                 Signed   
 
 
Normal sinus rhythm
Nonspecific T wave abnormality
Prolonged QT
Abnormal ECG
When compared with ECG of 19-JAN-2022 17:49,
Nonspecific T wave abnormality, worse in Anterolateral leads
Confirmed by Jaun Chavarria MD ([id number]) on 9/8/2024 6:46:59 PM
 
 
 
 
 
 
 
 
 
 
 
 
 
 
 
 
 
 
 
 
 
 
 
 
 
 
 
 
 
 
 
 
 
 
 
 
 
 
    Electronically Signed By: JAUN CHAVARRIA MD  09/08/24 1847
_________________________________________________________________________________________
PATIENT NAME:     EVELYNANAHI                     
MEDICAL RECORD #: [id number]                     Electrocardiogram             
          ACCT #: [id number]  
DATE OF BIRTH:   [birth year]                                       
PHYSICIAN:   JAUN CHAVARRIA MD                       REPORT #: [id number]
REPORT IS CONFIDENTIAL AND NOT TO BE RELEASED WITHOUT AUTHORIZATION

## 2024-09-08 NOTE — NUR
BED ALARMING. PATIENT STATED WANT TO USE THE BATHROOM. RN CHARGE AND THIS CNA
HELPED PATIENT USE THE BEDSIDE COMMODE. PATIENT IS UNSTABLE ON HER FEET.
PATIENT HOLD ON TO THE STAFF'S ARMS TO BE ABLE TO STAND UP AND MAKE STEPS.
PATIENT IS WOBBLY.
PATIENT IS BACK IN BED. BED ALARM ON FOR SAFETY.

## 2024-09-08 NOTE — NUR
PT TO FLOOR AT 1551, THIS RN TO BEDSIDE AT 1605. PT STATES PAIN IS 0/10 AT THE
MOMENT WHILE LYING IN BED. PT DISORIENTED TO DATE AND TIME, STATES IT IS
SATURDAY THE 27TH OF SEPTEMBER, 2005. PT REORIENTED, PT STATES "I WILL
PROBABLY FORGET". PT STATES TOES ARE NUMB AND TINGLING, BUT INTACT SENSATION
IN ALL OTHER AREAS. PT STATES SHE HAS NOT DRANK ALCOHOL SINCE SHE WAS LAST
ADMITTED APPROXIMATELY 2 WEEKS AGO, HAS BEEN DOING [facility] OUTPATIENT TREATMENT.
PT FORGETFUL AND CONFUSED AT TIMES DURING ADMISSION PROCESS. PT ABLE TO ANSWER
ADMISSION QUESTIONS APPROPRIATELY. PT STATES NO CURRENT NEEDS, CALL LIGHT
WITHIN REACH. BED ALARM ON FOR SAFETY. PT TAKES PO MEDICATION W/O DIFFICULTY.

## 2024-09-09 NOTE — NUR
PT BED ALARM GOING OFF. PT MUMBLING. PT REORIENTED. NOW BACK IN BED, BED ALARM
ON. BOOK PROVIDED FOR HER TO READ. DENIES ANY NEEDS, CALL LIGHT IN REACH

## 2024-09-09 NOTE — NUR
PT OOB TO VOID AN UNMEASURED CONCENTRATED AMOUNT. ABLE TO DO OWN SURJIT CARE AND
WASH HANDS AT SINK. BACK TO BED. GAIT UNSTEADY. PT HAS DIFFICULTY FOLLOWING
DIRECTIONS AND MOVEMENT IS UNCOORDINATED. DENIES FURTHER NEEDS. BED ALARM FOR
SAFETY. CALL LIGHT IN REACH.

## 2024-09-09 NOTE — NUR
PT ASSESSMENT COMPLETE. PT GROANING AND MUMBLING. PT C/O 10/10 PAIN. HEAT PACK
PROVIDED. PT RESTING IN BED. WHEN ASKED WHAT THE YEAR WAS, SHE STATES "2004"
REORIENTED TO DATE.
BED ALARM ON. DENIES ANY NEEDS AT THIS TIME, CALL
LIGHT IN REACH

## 2024-09-09 NOTE — NUR
BED ALARM SOUNDING. PT UP TO BR WITH 2PA TO VOID. BACK TO BED, LAUREEN FAIR. PT
MOVEMENT UNCOORDINATED AND GAIT UNSTEADY. A&O X 3. NO FURTHER NEEDS. BED ALARM
FOR SAFETY. CALL LIGHT IN REACH.

## 2024-09-09 NOTE — NUR
BED ALARMING. THIS CNA IN TO THE ROOM. PATIENT IS ALREADY OUT OF THE BED
STATING IM GOING TO THE BATHROOM. PATIENT HAS UNSTABLE GAIT.
PRIMARY RN CAME TO HELP.
PATIENT VOIDED UN MEASURED. PATIENT IS BACK IN BED. BED ALARM ON. V/S AND
OUTPUT TAKEN AND RECORDED.

## 2024-09-09 NOTE — NUR
PT RESTING WITH EYES CLOSED. AWAKENS TO VOICE. PT MUMBLES INCOHERENTLY AT
TIMES AND ANSWERS QUESTIONS APPROPRIATELY AT OTHERS. UP TO BSC WITH 2PA. GAIT
UNSTEADY. PT ABLE TO DO WON SURJIT CARE. BACK TO BED. PT MOVEMENT UNCOORDINATED
AND HAS DIFFICULTY FOLLOWING DIRECTIONS. INCREASED SPASMS IN ARMS AND LEGS
AND PAIN NOTED WITH MOVEMENT. PT REPORTS PAIN FROM LEFT SIDE OF NECK THAT
RADIATES DOWN AND ENDS ON RIGHT SIDE OF HER ABDOMEN 9/10. PRN FOR PAIN ADMIN
PER EMAR. ASSISTED PT TO REPOSITION. NO FURTHER NEEDS. BED ALARM FOR SAFETY.
CALL LIGHT IN REACH.

## 2024-09-09 NOTE — NUR
PT RESTING IN BED. MOANS WHEN AWAKE. FAMILY IN ROOM. IV DILAUDID ADMIN PER
EMAR. DENIES ANY NEEDS AT THIS TIME, CALL LIGHT IN REACH

## 2024-09-09 NOTE — NUR
IVF INFUSING PER EMAR. PT STARTLES EASILY. GRIMACES AND MOANS WHEN AWAKE. PT
MUMBLING NONSENSICALLY. ALERT TO SELF AND ENVIRONMENT WHEN AWAKE.

## 2024-09-09 NOTE — NUR
BED ALARM GOING OFF. SN JESSICA AND DEON BERGER IN ROOM TO ASSIST. 2 PA W FWW;
PT NOT ABLE TO UTILIZE WALKER PROPERLY. UNSTEADY GAIT. PT BACK TO CHAIR.
DISORIENTED TO DATE AND LOCATION. SHE STATES THE YEAR IS "2004" AND SHE IS IN
"[place], [place]". PT C/O PAIN. GRIMACING. MUMBLING HER WORDS. DECLINES
USE OF DISTRACTION OR HEAT PACK. DENIES ANY NEEDS AT THIS TIME, CALL LIGHT IN
REACH.

## 2024-09-09 NOTE — NUR
SHIFT ASSESSMENT COMPLETE. PT C/O 10/10 PAIN L. FLANK/ EPIGASTRIC REGION. HEAT
PACK PROVIDED. STATES THAT SHE IS IN "[place]". 16 ON ALCOHOL WITHDRAWAL
ASSESSMENT. MEDICATIONS ADMIN PER EMAR. DECLINES BREAKFAST AT THIS TIME.
DENIES ANY NEEDS AT THIS TIME, CALL LIGHT IN REACH

## 2024-09-09 NOTE — NUR
REPORT RECIEVED FROM IAIN GARCÍA. PT AWAKE AND RESTING IN BED. DENIES ANY NEEDS
AT THIS TIME, CALL LIGHT IN REACH none/heart disease/hypertension/stroke

## 2024-09-09 NOTE — NUR
PT SITTING UPRIGHT IN BED TO TAKE PILLS. CRYING OUT IN PAIN ON LEFT FLANK
AREA. STATES IT IS MOVING AROUND TO THE FRONT UNDER THE RIBS. GIVEN HOT PACK,
BREAKFAST ON BEDSIDE TABLE. TOOK MEDS WO DIFF.

## 2024-09-09 NOTE — NUR
IV ABX RUNNING PER EMAR.  AT BEDSIDE. UPDATE GIVEN TO HIM. PT AWAKE AND
RESTING IN BED. DENIES ANY NEEDS, CALL LIGHT IN REACH

## 2024-09-09 NOTE — NUR
PT SLEEPING IN BED AFTER PAIN MEDS. ADMINISTERED THIAMINE PER EMAR. COVERED
WITH BLANKET. APPEARS COMFORTABLE.

## 2024-09-09 NOTE — NUR
REPORT RECEIVED FROM DAY SHIFT RN. PT LYING IN BED RESTING WITH EYES CLOSED.
RESPIRATIONS EVEN. BED ALARM FOR SAEFTY. CALL LIGHT IN REACH. WHITE BOARD
UPDATED.

## 2024-09-09 NOTE — NUR
RESTING IN BED, EYES ARE CLOSED. PATIENT WOKE UP WHEN THE DISCHARGE PLANNER
WALKED INTO THE PATIENT'S ROOM. PATIENT IS HAVING MUSCLE CRAMPS THAT COME AND
GO. PATIENT WANTS TO GO HOME WITH HER  WHEN DISCHARGED. PATIENT WAS
GIVEN A [facility] CARD AND ENCOURGED PATIENT TO CALL. PATIENT STATES "MAYBE".
PATIENT HAS NO MONEY ISSUES AND CAN AFFORD HOUSING AND FOOD. PATIENT DOES NOT
USE DME. PATIENT'S DEMOGRAPHICS ARE CORRECT. PATIENT HAS FAMILY THAT IS
WILLING TO HELP AS NEEDED. PATIENT DOES NOT WANT TO TALK ANYMORE.

## 2024-09-09 NOTE — NUR
UR CLINICAL REVIEW:
[facility] INPT NEUROLOGY
GRG
[facility] PPO
INPT 09/08/24 @1535
ORDER MATCHES REG
WILL SEND CLINICAL FOR AUTH
REVIEW AFTER NOTIFICATION
IS COMPLETE
DISCHARGE TO HOME WHEN
STABLE
09/11/24

## 2024-09-09 NOTE — NUR
CALL LIGHT ANSWERED. PT REPORTS 10/10 LEFT SIDE/BACK PAIN. NO PRN'S AVAILABLE.
MD NOTIFIED. NEW TELEPHONE ORDER RECEIVED VERIFIED WITH READBACK METHOD. MEDS
ADMIN PER EMAR. WARM COMPRESS PROVIDED. NO FURTHER NEEDS. CALL LIGHT IN REACH.
BED ALARM FOR SAFETY.

## 2024-09-10 NOTE — NUR
LAB IN ROOM FOR MORNING DRAW. VS OBTAINED. UP TO BSC WITH 2PA TO VOID 350 ML
ORANGE COLORED URINE. PT ABLE TO DO OWN SURJIT CARE. BACK TO BED, LAUREEN FAIR.
INCREASED PAIN AND SPASMS WITH MOVEMENT. MOVEMENTS REMAIN UNCOORDINATED AND
JERKY. SIPS OF WATER PROVIDED. PT REMAINS WELL WITHIN 2000 ML FLUID
RESTRICTION. PT DENIES FURTHER NEEDS. BED ALARM FOR SAFETY. CALL LIGHT IN
REACH.

## 2024-09-10 NOTE — NUR
PT HEARD CALLING OUT AND CRYING FROM NURSES STATION. PT FOUND TO BE HOLDING
LEFT SIDE RATING PAIN 5/10. NO PRN'S AVAILABLE. MD NOTIFIED. NEW TELEPHONE
ORDERS RECEIVED VERIFIED WITH READBACK METHOD.

## 2024-09-10 NOTE — NUR
PT ROLLED UP ONTO RIGHT SIDE MOANING AND HOLDING LEFT SIDE. REPORTS PAIN
10/10. PRN FOR PAIN ADMIN PER EMAR.

## 2024-09-10 NOTE — NUR
SPOUSE RAJ IN ROOM WITH PATIENT ANAHI.  HE WAS UPDATED REGARDING PLAN OF
CARE AND MEDICATIONS.  ALL QUESTIONS AND CONCERNS WERE ADDRESSED.  RAJ STATED
GRATITUDE FOR HIS SPOUSE'S CARE.
 
ANAHI IS NOTED TO BE RESTING IN BED WITH EYES CLOSED.  SHE TALKS IN HER SLEEP
AND SEEMS RESTLESS.  REORIENTATION PROVIDED.  ANAHI ENDORSES NO NEEDS THIS
HOUSE

## 2024-09-10 NOTE — NUR
SBAR REPORT RECEIVED FROM IAIN VILLANUEVA. ANAHI IS NOTED TO BE IN BED WITH EYES
OPEN.  SHE IS IMPULSIVE AND HAS BED ALARM ON.  VSS PER MONITOR.  FENTANYL
PATCH ON FOR PAIN COMFORT AND CONTROL.  SAFETY CHECK OF ROOM PERFORMED.  NO
ADDITIONAL NEEDS IDENTIFIED AT THIS TIME

## 2024-09-10 NOTE — NUR
PT UP IN THE CHAIR AWAKENS TO VOICE NOON MEAL SERVED. SHE SHOWS SOME INTREST
IN EATING, BREAKFAST WAS REFUSED

## 2024-09-10 NOTE — NUR
PT BACK FROM IMAGING. REATTATCHED TO IV INFUSION. PT TOLERATED WELL. PT
REMAINS SOLMULENT AND EYES MOSTLY CLOSED. STATES HER PAIN IN THE SIDE ISN'T
BAD IF SHE DOESN'T MOVE.

## 2024-09-10 NOTE — NUR
PATIENT IN BED AT THIS TIME. CNA CHARTED VITALS AND I&O'S. CALL LIGHT WITHIN
REACH, NO FURTHER NEEDS AT THIS TIME.

## 2024-09-10 NOTE — NUR
Spoke with Brooke. She is able to answer 2 questions. Pt remains bewildered
when asked questions. Pt is unable to answer most questions. Update from Dr. Walsh, pt does have fx ribs and this explains her severe pain. No plan for
dc today.

## 2024-09-10 NOTE — NUR
PATIENT IN CHAIR AT THIS TIME. CNA ASSISTED PATIENT IN AMBULATING TO THE
CHAIR. PATIENT USED FRONT WHEELED WALKER. CALL LIGHT WITHIN REACH, NO FURTHER
NEEDS AT THIS TIME.

## 2024-09-10 NOTE — NUR
PATIENT IN CHAIR AT THIS TIME. VITALS AND I&O'S CHARTED. CNA PROVIDED FRESH
LINENS. CALL LIGHT WITHIN REACH, NO FURTHER NEEDS AT THIS TIME.

## 2024-09-10 NOTE — NUR
PATIENT ANAHI CONTINUES TO REST WITH EYES CLOSED.  SHE DOES NOT ENDORSE ANY
DISCOMFORT AND HER SPEECH PATTER IS UNDERSTANDABLE.  ANAHI TOLD THIS RN A
STORY ABOUT FINDING HER DOG

## 2024-09-10 NOTE — NUR
REPORT RECEIVED FROM DAY SHIFT RN. PT LYING IN BED RESTING WITH EYES CLOSED.
AWAKENS EASILY. VERIFIED WITH DAY SHIFT RN FENTANYL PATCH IN PLACE ON UPPER
RIGHT SHOULDER. PT DENIES NEEDS AT THIS TIME. WHITE BOARD UPDATED. CALL LIGHT
IN REACH. BED ALARM FOR SAFETY.

## 2024-09-10 NOTE — NUR
PT CONTINUES TO HOLD LEFT LATERAL SIDE WHILE MOANING/CRYING AND SHIFTING IN
BED. PT ROLLING BACK IN FORTH IN BED, PULLING LEGS UP. AT ONE POINT PT STATES
"I FELT A POP." VS OBTAINED, WNL. ASSESSMENT UNCHANGED. PRN FOR PAIN ADMIN PER
EMAR. BED ALARM IN PLACE. CALL LIGHT IN REACH.

## 2024-09-10 NOTE — NUR
PT SITTING IN CHAIR AGREES PAIN HAS IMPROVED. SHE APPEARS RELAXED FOR A TIME
THEN HURTS FOR A FEW SECONDS AND RELAXES AGAIN. NO LONGER HOLDING CONSTANT
PRESSURE TO HER BACK WITH HER HAND, DENIES NEEDS AT THIS TIME

## 2024-09-10 NOTE — NUR
PT  IN TO VISIT. QUESTIONS ANSWERED. PT MORE ALERT THIS EVENING AND
RESPONDS APPROPRIATELY  TO QUESTIONS. SPEECH IS MORE CLEAR AS WELL. A&O X 3.
EVENING ASSESSMENT COMPLETE. SCHEDULED MEDS ADMIN PER EMAR. 2PA TO BSC TO VOID
SMALL AMOUNT AND HAVE SMALL SOFT BM. GAIT UNSTEADY. MOVEMENT UNCOORDINATED.
TREMORS IN ARMS NOTED. PT ABLE TO FOLLOW DIRECTIONS MORE CLEARLY THIS EVENING
THAN LAST. BACK TO BED, LAUREEN FAIR. REPORTS LEFT RIB PAIN 8/10. PRN FOR PAIN
ADMIN PER EMAR. PT ABLE TO DRINK A CLEAR LIQUID ENSURE. NO FURTHER NEEDS. BED
ALARM FOR SAFETY. CALL LIGHT IN REACH.

## 2024-09-10 NOTE — NUR
VISITED DURING SPIRITUAL CARE ROUNDS.  SHORT CONVERSATION AS PT RETICENT TO
CONVERSE.   PROVIDED SUPPORTIVE PRESENCE, HOSPITALITY, PRAYER.

## 2024-09-10 NOTE — NUR
PATIENT IN BED AT THIS TIME. CNA WENT INTO PATIENT ROOM TO DO HOURLY ROUNDS.
CALL LIGHT WITHIN REACH, NO FURTHER NEEDS AT THIS TIME.

## 2024-09-10 NOTE — NUR
PT IS UP TO THE CHAIR FOR MORNING MEAL, DENIES HUNGER.  IS PRESENT
REQUESTS TO SEE DR FRIED FOR QUESTIONS HE HAS. DR FRIED TO THE ROOM NOW TO
ASSESS PT AND ANSWER QUESTIONS, PLAN FORWARD DISCUSSED AT LENGTH ALL QUESTIONS
ANSWERED

## 2024-09-10 NOTE — NUR
PT RESTING SOUNDLY AT TIME OF SHIFT REPORT, LEFT UNDISTURBED. BED ALARM IS
SET, CALL LIGHT AND NEEDED ITEMS AT BEDSIDE.

## 2024-09-11 NOTE — NUR
PATIENT IS SITTING UPRIGHT IN BED AWAKE AND ALERT. POTASSIUM CHLORIDE PILLS
AND 2 G MAGNESIUM SULFATE ADMINISTERED AT THIS TIME. CPOX AT THE BEDSIDE.
PATIENT STATED NO FURTHER NEEDS AT THIS TIME. CALL LIGHT AND PERSONAL
BELONGINGS ARE WITHIN REACH.

## 2024-09-11 NOTE — NUR
LAB IN ROOM FOR MORNING DRAW. PT UP TO BSC TO VOID 300 ML ORANGISH COLORED
URINE. BACK TO BED, LAUREEN FAIR. PT A&O X 3. MOVEMENTS REMAIN SHAKY AND
UNCOORDINATED. PT ABLE TO FOLLOW DIRECTION. SPEECH IS CLEAR. PT ABLE TO ANSWER
QUESTIONS APPROPRIATELY. FRESH WATER PROVIDED. NO FURTHER NEEDS. BED ALARM FOR
SAFETY.

## 2024-09-11 NOTE — NUR
Patient used call light to use the bedside commode. Patient voided and back in
bed 2PA. Patient boosted up in bed and covered with her sheets. She denies
needing anything at this time.

## 2024-09-11 NOTE — NUR
REPORT RECEIVED FROM NIGHT SHIFT IAIN GARCÍA. PATIENT WITH FENTANYL PATCH
LOCATED ON THE RIGHT SHOULDER AREA. PATCH OBSERVED WITH IAIN GARCÍA. PATIENT
STATED NO NEEDS AT THIS TIME. CALL LIGHT AND PERSONAL BELONGINGS ARE WITHIN
REACH.

## 2024-09-11 NOTE — NUR
Spoke with Brooke. She much clearer today. Pt discussing she has not been
working at [employer] since she hurt her leg. We discussed her last discharge and she
had been very depressed.  She does not remember if she started her
antidepressant. I attempted to discuss tox screen and if she has is still
drinking. Pt states she "does not want to discuss any of this". Pt needed to
void and starts to get up. Aid to room and assisted to bsc with 2 person
assist. Pt is very jerky and weak. Pt required 2 people to keep from falling.
Pt is also very painful getting to sitting or lying position from her
fractured ribs.

## 2024-09-11 NOTE — NUR
Patient given evening medications. Pt requested something to help her sleep as
she feels she has been napping a lot today and wont be able to fall asleep. Pt
given melatonin. Bed alarm on. Bed laid back for patient and lights turned
down.

## 2024-09-11 NOTE — NUR
PATIENT IS LYING IN BED WITH THE HOB ELEVATED. PATIENT IS ALERT AND CALM.
PATIENT IS ALERT AND ORIENTED TO SELF AND SITUATION ONLY. PATIENT IS
DISORIENTED TO PLACE AND MONTH. PATIENT WITH PAIN RATED 4/10 THAT "COMES AND
GOES". PATIENT IS NOT REQUESTING PAIN MEDICATION AT THIS TIME. IV SITE FLUSHED
WITH 10 ML NORMAL SALINE AND NS IS INFUSING  ML/HR. IV DRESSING IS
CLEAN, DRY, AND INTACT. PATIENT STATED NO FURTHER NEEDS AT THIS TIME. CALL
LIGHT AND PERSONAL BELONGINGS ARE WITHIN REACH.

## 2024-09-11 NOTE — NUR
REPORT RECEIVED SANCHO TIMMONS. pt RESTING IN THE BED. BOARD UPDATED. pt REQUESTED
TO USE THE BR. pt 1PA TO BR WITH THE FWW. pt BACK TO BED. pt DENIES ANY OTHER
NEEDS AT THIS TIME. CALL LIGHT WITHIN REACH. DINNER LAVINIA TAKEN AWAY.

## 2024-09-11 NOTE — NUR
PATIENT IS SITTING UPRIGHT IN THE CHAIR WITH EYES CLOSED AND RESPIRATIONS ARE
EVEN AND UNLABORED. CALL LIGHT AND PERSONAL BELONGINGS ARE WITHIN REACH.

## 2024-09-11 NOTE — NUR
PATIENT IS LYING IN BED WITH EYES CLOSED AND RESPIRATIONS ARE EVEN AND
UNLABORED. CPOX AT THE BEDSIDE. NS INFUSING  ML/HR. CALL LIGHT AND
PERSONAL BELONGINGS ARE WITHIN REACH.

## 2024-09-11 NOTE — NUR
PATIENT IS SITTING UPRIGHT IN BED WITH EYES OPEN AND RESPIRATIONS ARE EVEN AND
UNLABORED. CALL LIGHT AND PERSONAL BELONGINGS ARE WITHIN REACH.

## 2024-09-11 NOTE — NUR
VITALS AND I&OS CHARTED. PATIENT NOW RESTING WITH EYES CLOSED. 50% OF
BREAKFAST CONSUMED. CALL LIGHTIN EASY REACH

## 2024-09-11 NOTE — NUR
IV PUMP CLEARED AT THIS TIME. PATIENT  IS IN THE ROOM VISITING. PATIENT
STATED NO NEEDS AT THIS TIME. CALL LIGHT AND PERSONAL BELONGINGS ARE WITHIN
REACH.

## 2024-09-11 NOTE — NUR
PT RESTING WITH EYES CLOSED. AWAKENS EASILY. UP TO BSC WITH 2PA TO VOID 200
ML. PT ABLE TO DO WON SURJIT CARE. BACK TO BED, LAUREEN FAIR. GAIT UNSTEADY. PT
MOVEMENT UNCOORDINATED AND SHAKY. IVF INFUSING PER ORDER. CPOX PLACED. SIPS OF
WATER PROVIDED. ASSESSMENT UNCHANGED. PT DENIES NEEDS. CALL LIGHT IN REACH.
BED ALARM IN PLACE.

## 2024-09-11 NOTE — NUR
ASSESSMENT DONE. pt DENIES ANY NEED AT THIS TIME. CALL LIGHT WITHIN REACH. pt
A&O X4. pt DENIES A HEAT PACK FOR HER BACK.

## 2024-09-11 NOTE — NUR
0800 AND 0900 MEDICATIONS ADMINISTERED PER THE EMAR. FULL ASSESSMENT COMPLETE
AND DOCUMENTED IN THE CHART. PATIENT IS ALERT AND ORIENTED TIMES FOUR. PATIENT
IS ON ROOM AIR WITH THE CPOX AT THE BEDSIDE. LUNG SOUNDS ARE CLEAR IN ALL LUNG
COX BILATERALLY. CARDIAC WITH NORMAL S1 AND S2 ON AUSCULTATION. SENSATION
INTACT WITH NO COMPLAINTS OF NUMBNESS AND TINGLING. BLE WEAKNESS
NOTED. PATIENT
IS A 2PA AND USES THE BEDSIDE COMMODE. BOWEL TONES ARE ACTIVE IN ALL FOUR
QUADRANTS. PATIENT IS ON A 2 GRAM SODIUM DIET WITH A 2,000 ML FLUID
RESTRICTION. IV SITE FLUSHED WITH 10 ML NORMAL SALINE. NS IS INFUSING 
ML/HR. IV DRESSING IS CLEAN, DRY, AND INTACT. PAIN IS 5/10. PATIENT WITH THE
FENTANYL PATCH ON THE RIGHT SHOULDER. PATIENT IS NOT REQUESTING PAIN
MEDICATION AT THIS TIME. PATIENT USED THE BEDSIDE COMMODE AT THIS TIME WITH
RN AND CNA. PATIENT IS LYING IN BED WITH EYES CLOSED AND RESPIRATIONS ARE EVEN
AND UNLABORED AFTER USING THE BEDSIDE COMMODE. PATIENT STATED NO FURTHER NEEDS
AT THIS TIME. CALL LIGHT AND PERSONAL BELONGINGS ARE WITHIN REACH. Detail Level: Detailed Quality 110: Preventive Care And Screening: Influenza Immunization: Influenza Immunization previously received during influenza season Quality 431: Preventive Care And Screening: Unhealthy Alcohol Use - Screening: Patient screened for unhealthy alcohol use using a single question and scores less than 2 times per year Quality 226: Preventive Care And Screening: Tobacco Use: Screening And Cessation Intervention: Patient screened for tobacco and is an ex-smoker Quality 130: Documentation Of Current Medications In The Medical Record: Current Medications Documented

## 2024-09-11 NOTE — NUR
PT IN BED RESTING WITH EYES CLOSED. OCCASIONAL TWITCHING MOVEMENTS NOTED IN
BUE. BED ALARM FOR SAFETY. CALL LIGHT IN REACH.

## 2024-09-11 NOTE — NUR
UR CONCURRENT REVIEW:
[facility] [place]-DOES NOT MEET GL DAY
[facility]
INPT 09/08/24 @ 1535
CLINICALS FAXED TO [facility]
FOR AUTH.
09/13/24

## 2024-09-12 NOTE — NUR
CALL LIGHT ANSWERED. PT NEEDED TO USE BATHROOM. CNA 1PA WITH FWW TO BATHROOM.
PT GAIT UNSTEADY. PT VOIDED AND ASSISTED BACK TO BED. OUTPUT MEASURED. PT
STATES NO FURTHER NEEDS AT THIS TIME. CALL LIGHT WITHIN REACH. show

## 2024-09-12 NOTE — NUR
REPORT RECEIVED FROM NORMA TIMMOSN, ALL QUESTIONS ANSWERED. PT LYING AWAKE IN
BED, VERIFIED FENTANYL PATCH ON RIGHT BACK SHOULDER. PT DENIES NEEDS AT THIS
TIME. CALL LIGHT IN REACH.

## 2024-09-12 NOTE — NUR
IN AS IV PUMP ALARMING. PT REPORTING TOILETING NEEDS. PT SL AT THIS TIME. 1PA
WITH FWW FROM BED TO RESTROOM. PT NOTED TO HAVE UNSTEADY/WABBLY GAIT. VOID
NOTED. 1PA WITH FWW BACK TO BED. PT DENIES ANY OTHER NEEDS AT THIS TIME. CALL
LIGHT IN REACH. BED ALARM ON. MALE VISITOR IN ROOM.
IAIN COTRES NOTIFIED THAT IV FLUID BAG IS EMPTY.
PT DENIES ANY OTHER NEEDS AT THIS TIME. CALL LIGHT IN REACH. BED ALARM ON.

## 2024-09-12 NOTE — NUR
MORNING ASSESSMENT COMPLETE. PT ALERT AND ORIENTED, DISORIENTED TO TIME. PT
STATES SHE "FEELS A LOT BETTER THAN BEFORE" STATES HER RIBS ONLY PAINFUL IF
TOUCHED OR MOVING IN BED, DENIES NEED FOR INTERVENTION AT THIS TIME. PT DENIES
FURHTER NEEDS AT THIS TIME. CALL LIGHT IN REACH.

## 2024-09-12 NOTE — NUR
In and spoke with Brooke. She will dc to home today. She worked with PT and
was walking better today. Pt has called her  and he will pick her up
from work.  She denies any needs. She declines a walker. We discussed HH and
pt declines states she does not want anyone in her home.  She also states she
drives and will be driving. Texted Dr. Walsh and asked if he would agree to
order OP therapy. Pt agrees to this. Verbal order for HH. Pt denies other
needs. Will leave shortly as spouse is on his way.

## 2024-09-12 NOTE — NUR
CALL LIGHT ANSWERED. PT NEEDED TO USE BATHROOM. CNA 1PA WITH FWW TO BATHROOM.
PT UNSTEADY GAIT. PT VOIDED AND ASSISTED BACK TO BED. OUTPUT MEAUSRED. PT
STATES NO FURTHER NEEDS AT THIS TIME. CALL LIGHT WITHIN REACH AND BED ALARM
ON.

## 2025-03-29 NOTE — XMS
PreManage Notification: ANAHI RIVAS MRN:[id number]
 
Security Information
 
Security Events
No recent Security Events currently on file
 
 
 
CRITERIA MET
------------
- [facility] - 2 Visits in 30 Days
 
 
CARE PROVIDERS
There are no care providers on record at this time.
 
[facility] has no Care Guidelines for this patient.
 
MEGAN VISIT COUNT (12 MO.)
-------------------------------------------------------------------------------------
5 [facility]
-------------------------------------------------------------------------------------
TOTAL 6
-------------------------------------------------------------------------------------
NOTE: Visits indicate total known visits.
 
ED/C VISIT TRACKING (12 MO.)
-------------------------------------------------------------------------------------
03/29/2025 10:12
[facility]
 
TYPE: Emergency
 
COMPLAINT:
- SWELLING
-------------------------------------------------------------------------------------
03/19/2025 16:45
[facility]
 
TYPE: Emergency
 
DIAGNOSES:
- Fall
- Falls, Rib fx's, 2 x pelvic fx's, Pneumothorax and Hemothorax
-------------------------------------------------------------------------------------
03/19/2025 12:33
[facility] [place]
 
TYPE: Emergency
 
COMPLAINT:
- FALL
 
DIAGNOSES:
- Coagulation defect, unspecified
 
- Essential (primary) hypertension
- Fall on same level from slipping, tripping and stumbling without subsequent
striking against object, initial encounter
- Gastro-esophageal reflux disease without esophagitis
- Other long term (current) drug therapy
- Personal history of nicotine dependence
- Pleurodynia
- Traumatic hemopneumothorax, initial encounter
- Unspecified cirrhosis of liver
-------------------------------------------------------------------------------------
11/02/2024 14:59
[facility]
 
TYPE: Emergency
 
COMPLAINT:
- FALL
 
DIAGNOSES:
- Essential (primary) hypertension
- Laceration without foreign body of left eyelid and periocular area, initial
encounter
- Other long term (current) drug therapy
- Personal history of nicotine dependence
- Striking against other object with subsequent fall, initial encounter
-------------------------------------------------------------------------------------
09/08/2024 09:56
[facility]
 
TYPE: Emergency
 
COMPLAINT:
- ALTERED STATUS
-------------------------------------------------------------------------------------
08/25/2024 17:37
[facility]
 
TYPE: Emergency
 
COMPLAINT:
- VOMITING
-------------------------------------------------------------------------------------
 
 
INPATIENT VISIT TRACKING (12 MO.)
-------------------------------------------------------------------------------------
03/19/2025 16:45
[facility]
 
TYPE: Surgery
 
DIAGNOSES:
- Alcohol dependence, uncomplicated
- Anemia, unspecified
- Liver disease, unspecified
- Multiple fractures of ribs, left side, initial encounter for closed fracture
- Multiple fractures of ribs, unspecified side, initial encounter for closed fracture
- Other specified fracture of left pubis, initial encounter for closed fracture
- Traumatic hemopneumothorax, initial encounter
 
- Wedge compression fracture of T11-T12 vertebra, initial encounter for closed
fracture
- Wedge compression fracture of T7-T8 vertebra, initial encounter for closed fracture
- Wedge compression fracture of T7-T8 vertebra, sequela
- Wedge compression fracture of T7-T8 vertebra, subsequent encounter for fracture
with routine healing
-------------------------------------------------------------------------------------
09/08/2024 15:35
[facility]
 
TYPE: Medical Surgical
 
COMPLAINT:
- UTI
 
DIAGNOSES:
- Acquired absence of other specified parts of digestive tract
- Epilepsy, unspecified, not intractable, without status epilepticus
- Fall on same level, unspecified, initial encounter
- Hypo-osmolality and hyponatremia
- Hypokalemia
- Hypomagnesemia
- Multiple fractures of ribs, left side, initial encounter for closed fracture
- Other specified bacterial agents as the cause of diseases classified elsewhere
- Personal history of nicotine dependence
- Repeated falls
- Unspecified mood [affective] disorder
- Urinary tract infection, site not specified
- Wernicke's encephalopathy
-------------------------------------------------------------------------------------
08/26/2024 08:25
[facility]
 
TYPE: Medical Surgical
 
COMPLAINT:
- DEHYDRATION,HYPOKALEMIA,ETOH WITHDRAWEL
 
DIAGNOSES:
- Acidosis, unspecified
- Acidosis, unspecified
- Acquired absence of other specified parts of digestive tract
- Acquired absence of other specified parts of digestive tract
- Alcohol use, unspecified with withdrawal, unspecified
- Alcohol use, unspecified with withdrawal, unspecified
- Alcoholic cirrhosis of liver without ascites
- Alcoholic cirrhosis of liver without ascites
- Anemia, unspecified
- Anemia, unspecified
- Chronic or unspecified gastric ulcer with hemorrhage
- Chronic or unspecified gastric ulcer with hemorrhage
- Dehydration
- Dehydration
- Depression, unspecified
- Depression, unspecified
- Diaphragmatic hernia without obstruction or gangrene
- Diaphragmatic hernia without obstruction or gangrene
- Essential (primary) hypertension
- Essential (primary) hypertension
- Gastritis, unspecified, with bleeding
 
- Gastritis, unspecified, with bleeding
- Gastro-esophageal reflux disease with esophagitis, with bleeding
- Gastro-esophageal reflux disease with esophagitis, with bleeding
- Gastrointestinal hemorrhage, unspecified
- Hypokalemia
- Hypokalemia
- Hypomagnesemia
- Hypomagnesemia
- Other disorders of phosphorus metabolism
- Other disorders of phosphorus metabolism
- Other specified postprocedural states
- Other specified postprocedural states
- Personal history of colonic polyps
- Personal history of colonic polyps
- Personal history of nicotine dependence
- Personal history of nicotine dependence
- Thrombocytopenia, unspecified
- Thrombocytopenia, unspecified
- Tobacco abuse counseling
- Tobacco abuse counseling
-------------------------------------------------------------------------------------
 
[email or web address]